# Patient Record
Sex: MALE | Race: BLACK OR AFRICAN AMERICAN | NOT HISPANIC OR LATINO | Employment: FULL TIME | URBAN - METROPOLITAN AREA
[De-identification: names, ages, dates, MRNs, and addresses within clinical notes are randomized per-mention and may not be internally consistent; named-entity substitution may affect disease eponyms.]

---

## 2022-05-03 ENCOUNTER — HOSPITAL ENCOUNTER (EMERGENCY)
Facility: HOSPITAL | Age: 32
Discharge: HOME | End: 2022-05-03
Attending: EMERGENCY MEDICINE
Payer: COMMERCIAL

## 2022-05-03 VITALS
WEIGHT: 178.57 LBS | BODY MASS INDEX: 28.03 KG/M2 | HEART RATE: 72 BPM | TEMPERATURE: 98.5 F | SYSTOLIC BLOOD PRESSURE: 151 MMHG | DIASTOLIC BLOOD PRESSURE: 70 MMHG | RESPIRATION RATE: 18 BRPM | OXYGEN SATURATION: 99 % | HEIGHT: 67 IN

## 2022-05-03 DIAGNOSIS — S61.011A LACERATION OF RIGHT THUMB: Primary | ICD-10-CM

## 2022-05-03 PROCEDURE — 90471 IMMUNIZATION ADMIN: CPT

## 2022-05-03 PROCEDURE — 99282 EMERGENCY DEPT VISIT SF MDM: CPT

## 2022-05-03 PROCEDURE — 99282 EMERGENCY DEPT VISIT SF MDM: CPT | Performed by: PHYSICIAN ASSISTANT

## 2022-05-03 PROCEDURE — 12001 RPR S/N/AX/GEN/TRNK 2.5CM/<: CPT | Performed by: PHYSICIAN ASSISTANT

## 2022-05-03 PROCEDURE — 90715 TDAP VACCINE 7 YRS/> IM: CPT | Performed by: PHYSICIAN ASSISTANT

## 2022-05-03 RX ORDER — LIDOCAINE HYDROCHLORIDE 10 MG/ML
5 INJECTION, SOLUTION EPIDURAL; INFILTRATION; INTRACAUDAL; PERINEURAL ONCE
Status: COMPLETED | OUTPATIENT
Start: 2022-05-03 | End: 2022-05-03

## 2022-05-03 RX ADMIN — LIDOCAINE HYDROCHLORIDE 5 ML: 10 INJECTION, SOLUTION EPIDURAL; INFILTRATION; INTRACAUDAL; PERINEURAL at 16:49

## 2022-05-03 RX ADMIN — TETANUS TOXOID, REDUCED DIPHTHERIA TOXOID AND ACELLULAR PERTUSSIS VACCINE, ADSORBED 0.5 ML: 5; 2.5; 8; 8; 2.5 SUSPENSION INTRAMUSCULAR at 16:49

## 2022-05-03 NOTE — Clinical Note
Elmira Tuttle was seen and treated in our emergency department on 5/3/2022             no use of right hand until suture removal in 10 days    Diagnosis:     Haydee Castellanos  may return to work on return date  He may return on this date: 05/03/2022         If you have any questions or concerns, please don't hesitate to call        Carmencita Martines PA-C    ______________________________           _______________          _______________  Hospital Representative                              Date                                Time

## 2022-05-03 NOTE — ED PROVIDER NOTES
History  Chief Complaint   Patient presents with    Finger Laceration     pt reports lac to right thumb from broken beer bottle  Bleeding controlled at present      Patient is a 37 y/o male, unknown last tetanus, right hand dominant presents emergency department for evaluation of laceration to right thumb  Patient states prior to arrival he accidentally cut his right thumb on a beer bottle, bleeding controlled with pressure  Patient does not believe there is any glass in his thumb has it was a clean cut  Patient with full range of motion of thumb, denies any numbness or tingling  None       History reviewed  No pertinent past medical history  History reviewed  No pertinent surgical history  History reviewed  No pertinent family history  I have reviewed and agree with the history as documented  E-Cigarette/Vaping    E-Cigarette Use Never User      E-Cigarette/Vaping Substances     Social History     Tobacco Use    Smoking status: Never Smoker    Smokeless tobacco: Never Used   Vaping Use    Vaping Use: Never used   Substance Use Topics    Alcohol use: Yes     Comment: weekends    Drug use: Yes     Types: Marijuana     Comment: on weekends       Review of Systems   Skin: Positive for wound  All other systems reviewed and are negative  Physical Exam  Physical Exam  Constitutional:       Appearance: Normal appearance  HENT:      Head: Normocephalic and atraumatic  Right Ear: External ear normal       Left Ear: External ear normal       Nose: Nose normal       Mouth/Throat:      Lips: Pink  Mouth: Mucous membranes are moist    Eyes:      Extraocular Movements: Extraocular movements intact  Conjunctiva/sclera: Conjunctivae normal    Pulmonary:      Effort: No tachypnea or respiratory distress  Musculoskeletal:        Hands:       Cervical back: Normal range of motion and neck supple  Comments: Neurovascularly intact distally    5/5 strength bilateral upper extremities  Radial pulse 2 +  Cap refill <2 seconds  Sensation intact  Skin:     General: Skin is warm  Capillary Refill: Capillary refill takes less than 2 seconds  Neurological:      Mental Status: He is alert and oriented to person, place, and time  GCS: GCS eye subscore is 4  GCS verbal subscore is 5  GCS motor subscore is 6  Psychiatric:         Mood and Affect: Mood and affect normal          Speech: Speech normal          Vital Signs  ED Triage Vitals [05/03/22 1603]   Temperature Pulse Respirations Blood Pressure SpO2   98 5 °F (36 9 °C) 72 18 151/70 99 %      Temp Source Heart Rate Source Patient Position - Orthostatic VS BP Location FiO2 (%)   Oral Monitor Sitting Left arm --      Pain Score       No Pain           Vitals:    05/03/22 1603   BP: 151/70   Pulse: 72   Patient Position - Orthostatic VS: Sitting         Visual Acuity      ED Medications  Medications   tetanus-diphtheria-acellular pertussis (BOOSTRIX) IM injection 0 5 mL (0 5 mL Intramuscular Given 5/3/22 1649)   lidocaine (PF) (XYLOCAINE-MPF) 1 % injection 5 mL (5 mL Infiltration Given 5/3/22 1649)       Diagnostic Studies  Results Reviewed     None                 No orders to display              Procedures  Laceration repair    Date/Time: 5/3/2022 5:26 PM  Performed by: Adonay Valera PA-C  Authorized by: Adonay Valera PA-C   Consent: Verbal consent obtained  Risks and benefits: risks, benefits and alternatives were discussed  Consent given by: patient  Patient identity confirmed: verbally with patient and arm band  Time out: Immediately prior to procedure a "time out" was called to verify the correct patient, procedure, equipment, support staff and site/side marked as required    Body area: upper extremity  Location details: right thumb  Laceration length: 2 cm  Foreign bodies: no foreign bodies  Tendon involvement: none  Nerve involvement: none  Vascular damage: no  Anesthesia: digital block    Anesthesia:  Local Anesthetic: lidocaine 1% without epinephrine  Anesthetic total: 3 mL    Sedation:  Patient sedated: no        Procedure Details:  Preparation: Patient was prepped and draped in the usual sterile fashion  Irrigation solution: saline  Irrigation method: jet lavage  Amount of cleaning: standard  Debridement: none  Degree of undermining: none  Skin closure: 4-0 nylon  Number of sutures: 6  Technique: simple  Approximation: close  Approximation difficulty: simple  Dressing: gauze roll (xerform)               ED Course                               SBIRT 20yo+      Most Recent Value   SBIRT (22 yo +)    In order to provide better care to our patients, we are screening all of our patients for alcohol and drug use  Would it be okay to ask you these screening questions? No Filed at: 05/03/2022 1617                    MDM  Number of Diagnoses or Management Options  Laceration of right thumb: new and does not require workup  Diagnosis management comments: Patient is a 37 y/o male, unknown last tetanus, right hand dominant presents emergency department for evaluation of laceration to right thumb  Will update tetanus, clean wound, repair sutures and dressed  Signs and symptoms infection discussed with patient reasons to return emergency department  Patient will need sutures out in 7-10 days    Patient verbalizes understanding and agrees with plan  The management plan was discussed in detail with the patient at bedside and all questions were answered  Prior to discharge, I provided both verbal and written instructions  I discussed with the patient the signs and symptoms for which to return to the emergency department  All questions were answered and patient was comfortable with the plan of care and discharged to home  The patient agrees to return to the Emergency Department for concerns and/or progression of illness          Disposition  Final diagnoses:   Laceration of right thumb     Time reflects when diagnosis was documented in both MDM as applicable and the Disposition within this note     Time User Action Codes Description Comment    5/3/2022  5:25 PM Susi Seal Add [C12 [id number]] Laceration of right thumb       ED Disposition     ED Disposition Condition Date/Time Comment    Discharge Stable Tue May 3, 2022  5:25 PM Aubrey Leyva discharge to home/self care  Follow-up Information     Follow up With Specialties Details Why Contact Info Additional [address] [facility] Emergency Department Emergency Medicine Go to  For suture removal in 7-10 days [facility] [id number]  [address] [facility] Emergency Department, [address] [place] [address], [id number]          There are no discharge medications for this patient  No discharge procedures on file      PDMP Review     None          ED Provider  Electronically Signed by           Luci Marte PA-C  05/03/22 7733

## 2022-05-03 NOTE — Clinical Note
Diony Phlegm was seen and treated in our emergency department on 5/3/2022  light duty x7-10 days    Diagnosis:     Inga Owusu  may return to work on return date  He may return on this date: 05/03/2022         If you have any questions or concerns, please don't hesitate to call        Negrito Rebolledo PA-C    ______________________________           _______________          _______________  Hospital Representative                              Date                                Time